# Patient Record
(demographics unavailable — no encounter records)

---

## 2025-04-15 NOTE — ASSESSMENT
[FreeTextEntry1] : ASSESSMENT: [The patient was accompanied today and was assisted with explaining their complaints today.] The patient comes in today with chronic exacerbated complaints of bilateral wrist and hand discomfort however left side significantly worse.  At this stage we have discussed arthropathy and tendinosis of the wrist.  We have discussed treatment options very thoroughly bracing activity modification.  Today she elects for injections which should be very helpful   The patient was adequately and thoroughly informed of my assessment of their current condition(s).  - This may diminish bodily function for the extremity. We discussed prognosis, tx modalities including operative and nonoperative options for the above diagnostic assessment. As always, 2nd opinion is always provided as an option.  When accessible, I was able to review other physicians note(s) including reviewing other tests, imaging results as well as personally view these results for my own interpretation.   Injection procedure for left first dorsal compartment tendon subsheath:   The risks and benefits of a steroid injection were discussed in detail. The risks include but are not limited to: pain, infection, swelling, flare response, bleeding, subcutaneous fat atrophy, skin depigmentation and/or elevation of blood sugar. The risk of incomplete resolution of symptoms, recurrence and additional intervention was reviewed and considered by the patient. The patient agreed to proceed and under a sterile prep, I injected 1 unit 6mg into 1 cc of a combination of Celestone and Lidocaine into the above stated location for the procedure. The patient tolerated the injection well.  Injection procedure for left first CMC joint-medium joint:   The risks and benefits of a steroid injection were discussed in detail. The risks include but are not limited to: pain, infection, swelling, flare response, bleeding, subcutaneous fat atrophy, skin depigmentation and/or elevation of blood sugar. The risk of incomplete resolution of symptoms, recurrence and additional intervention was reviewed and considered by the patient. The patient agreed to proceed and under a sterile prep, I injected 1 unit 6mg into 1 cc of a combination of Celestone and Lidocaine into the above stated location for the procedure. The patient tolerated the injection well.  The patient was adequately and thoroughly informed of my assessment of their current condition(s).  DISCUSSION: 1.  Injections as above.  Bracing activity modification.  Follow-up 3 months 2. [x] 3. [x]

## 2025-04-15 NOTE — HISTORY OF PRESENT ILLNESS
[FreeTextEntry1] : Jolene is a pleasant 75-year-old female presenting today with significant wrist and thumb discomfort.  Describes difficulty with gripping and pinching.  Describes swelling.  It affects ADLs.  Atraumatic

## 2025-04-15 NOTE — PHYSICAL EXAM
[de-identified] : Examination at the level of the [left] wrist reveals tenderness at the level of the first dorsal compartment with a positive finkelstein. Examination of the left thumb basal joint reveals pain with compression of the CMC joint with a positive grind test for pain.  [Adjacent thumb MCP joint is stable without hyperextension] [de-identified] : [4] views of [bilateral hands and wrists] were obtained today in my office and were seen by me and discussed with the patient.  These [show findings consistent with bilateral basal joint OA and findings of IP joint OA]

## 2025-07-23 NOTE — ASSESSMENT
[FreeTextEntry1] : ASSESSMENT: The patient comes in today with chronic exacerbated left wrist and thumb discomfort. Describes difficulty with gripping and pinching. Describes swelling. It affects ADLs. Atraumatic.  She describes relief with prior injections, however some symptoms persist.  Symptoms today are consistent with left wrist de Quervain's tenosynovitis and left thumb CMC joint arthropathy.  Treatment modalities were discussed, the patient elects for repeat injections as prior injections were helpful.  We have also discussed activity modifications and bracing for her condition.  We have discussed surgical modalities including basal joint denervation surgery.   The patient was adequately and thoroughly informed of my assessment of their current condition(s).  - This may diminish bodily function for the extremity.  We discussed prognosis, treatment modalities including operative and nonoperative options for the above diagnostic assessment. As always, 2nd opinion is always provided as an option. For this, when accessible, I was able to review other physicians note(s) including reviewing other tests, imaging results as well as personally view these results for my own interpretation.      Injection Procedure: [Left wrist de Quervain's tendon subsheath] The risks and benefits of a steroid injection were discussed in detail. The risks include but are not limited to: pain, infection, swelling, flare response, bleeding, subcutaneous fat atrophy, skin depigmentation and/or elevation of blood sugar. The risk of incomplete resolution of symptoms, recurrence and additional intervention was reviewed and considered by the patient.  The patient agreed to proceed and under a sterile prep, I injected 1 unit (6mg) into 1 cc of a combination of Celestone and Lidocaine into the above stated location for the procedure. The patient tolerated the injection well.  Injection Procedure: [Left thumb CMC medium joint] The risks and benefits of a steroid injection were discussed in detail. The risks include but are not limited to: pain, infection, swelling, flare response, bleeding, subcutaneous fat atrophy, skin depigmentation and/or elevation of blood sugar. The risk of incomplete resolution of symptoms, recurrence and additional intervention was reviewed and considered by the patient.  The patient agreed to proceed and under a sterile prep, I injected 1 unit (6mg) into 1 cc of a combination of Celestone and Lidocaine into the above stated location for the procedure. The patient tolerated the injection well   The patient was adequately and thoroughly informed of my assessment of their current condition(s).   DISCUSSION: 1.  Injections as above.  Activity modifications.  Bracing.  Positive response.  Follow-up in 3 months. 2.  We have discussed basal joint denervation surgery. 3. [x]

## 2025-07-23 NOTE — HISTORY OF PRESENT ILLNESS
[FreeTextEntry1] : Jolene is a pleasant 76-year-old female presenting today with significant chronic exacerbated left wrist and thumb discomfort. Describes difficulty with gripping and pinching. Describes swelling. It affects ADLs. Atraumatic.  She describes relief with prior injections, however some symptoms persist.

## 2025-07-23 NOTE — PHYSICAL EXAM
[de-identified] : Examination at the level of the [left] wrist reveals tenderness at the level of the first dorsal compartment with a positive finkelstein.  Examination of the [left] thumb basal joint reveals pain with compression of the CMC joint with a positive grind test for pain. Adjacent thumb MCP joint is stable without hyperextension  [de-identified] : [4] views of [bilateral hands and wrists] were reviewed today in my office and were seen by me and discussed with the patient.  These [show findings consistent with bilateral basal joint OA and findings of IP joint OA]